# Patient Record
(demographics unavailable — no encounter records)

---

## 2024-12-12 NOTE — REVIEW OF SYSTEMS
[Anxiety] : anxiety [TextEntry] : CARDIOVASCULAR: Negative RESPIRATORY: Negative GASTROINTESTINAL: Negative NEUROLOGICAL: Negative

## 2024-12-12 NOTE — HEALTH RISK ASSESSMENT
[No] : In the past 12 months have you used drugs other than those required for medical reasons? No [No falls in past year] : Patient reported no falls in the past year [0] : 2) Feeling down, depressed, or hopeless: Not at all (0) [Never] : Never [de-identified] : None [[id number]] : 0

## 2024-12-12 NOTE — HISTORY OF PRESENT ILLNESS
[de-identified] : 35 year old  female patient with history of stable Hypertension, Thrombocytosis, Vitamin D Deficiency, history as stated, presented for follow up examination. Patient is compliant with all medications. ROS as stated.

## 2024-12-12 NOTE — HISTORY OF PRESENT ILLNESS
[de-identified] : 35 year old  female patient with history of stable Hypertension, Thrombocytosis, Vitamin D Deficiency, history as stated, presented for follow up examination. Patient is compliant with all medications. ROS as stated.

## 2024-12-12 NOTE — HEALTH RISK ASSESSMENT
[No] : In the past 12 months have you used drugs other than those required for medical reasons? No [No falls in past year] : Patient reported no falls in the past year [0] : 2) Feeling down, depressed, or hopeless: Not at all (0) [Never] : Never [de-identified] : None [[id number]] : 0

## 2024-12-12 NOTE — HISTORY OF PRESENT ILLNESS
[de-identified] : 35 year old  female patient with history of stable Hypertension, Thrombocytosis, Vitamin D Deficiency, history as stated, presented for follow up examination. Patient is compliant with all medications. ROS as stated.

## 2024-12-12 NOTE — ASSESSMENT
[FreeTextEntry1] : 35 year old female found to have stable Hypertension, Thrombocytosis, Vitamin D Deficiency, with the current prescription regimen as recommended, diet and lifestyle modifications, as counseled. Prior results reviewed, interpreted and discussed with the patient during today's examination, as appropriate. Follow up, treatment plan and tests, as ordered.   Total time spent:: 25 minutes Including: Preparation prior to visit - Reviewing prior record, results of tests and Consultation Reports as applicable Conducting an appropriate H & P during today's encounter Appropriate orders for tests, medications and procedures, as applicable Counseling patient Note completion

## 2024-12-12 NOTE — HEALTH RISK ASSESSMENT
[No] : In the past 12 months have you used drugs other than those required for medical reasons? No [No falls in past year] : Patient reported no falls in the past year [0] : 2) Feeling down, depressed, or hopeless: Not at all (0) [Never] : Never [de-identified] : None [[id number]] : 0

## 2025-04-21 NOTE — HEALTH RISK ASSESSMENT
[No] : In the past 12 months have you used drugs other than those required for medical reasons? No [No falls in past year] : Patient reported no falls in the past year [0] : 2) Feeling down, depressed, or hopeless: Not at all (0) [Never] : Never [de-identified] : None [[id number]] : 0

## 2025-04-21 NOTE — HISTORY OF PRESENT ILLNESS
[de-identified] : 35 year old  female patient with history of stable Hypertension, Thrombocytosis, Vitamin D Deficiency, history as stated, presented for follow up examination. Patient is compliant with all medications. ROS as stated.